# Patient Record
Sex: FEMALE | Race: WHITE | NOT HISPANIC OR LATINO | URBAN - METROPOLITAN AREA
[De-identification: names, ages, dates, MRNs, and addresses within clinical notes are randomized per-mention and may not be internally consistent; named-entity substitution may affect disease eponyms.]

---

## 2023-08-16 ENCOUNTER — TELEPHONE (OUTPATIENT)
Age: 63
End: 2023-08-16

## 2023-08-16 ENCOUNTER — PREP FOR PROCEDURE (OUTPATIENT)
Age: 63
End: 2023-08-16

## 2023-08-16 DIAGNOSIS — Z12.11 COLON CANCER SCREENING: Primary | ICD-10-CM

## 2023-08-16 NOTE — TELEPHONE ENCOUNTER
08/16/23  Screened by: Shivam Payton MA    Referring Provider pcp    Pre- Screening: There is no height or weight on file to calculate BMI. Has patient been referred for a routine screening Colonoscopy? yes  Is the patient between 43-73 years old? yes      Previous Colonoscopy yes   If yes:    Date: 2013    Facility:     Reason:       SCHEDULING STAFF: If the patient is between 39yrs-51yrs, please advise patient to confirm benefits/coverage with their insurance company for a routine screening colonoscopy, some insurance carriers will only cover at [address] [place] [address] or older. If the patient is over 66years old, please schedule an office visit. Does the patient want to see a Gastroenterologist prior to their procedure OR are they having any GI symptoms? no    Has the patient been hospitalized or had abdominal surgery in the past 6 months? no    Does the patient use supplemental oxygen? no    Does the patient take Coumadin, Lovenox, Plavix, Elliquis, Xarelto, or other blood thinning medication? no    Has the patient had a stroke, cardiac event, or stent placed in the past year? no    SCHEDULING STAFF: If patient answers NO to above questions, then schedule procedure. If patient answers YES to above questions, then schedule office appointment. Passed oa    If patient is between 45yrs - 49yrs, please advise patient that we will have to confirm benefits & coverage with their insurance company for a routine screening colonoscopy.

## 2023-08-16 NOTE — TELEPHONE ENCOUNTER
Scheduled date of colonoscopy (as of today):10/2/2023    Physician performing colonoscopy:Aarti Aranda     Location of colonoscopy: [id number] [facility]    Bowel prep reviewed with patient:yes    Instructions reviewed with patient by:yanira jaffe Clearances: dm       ASC Assessment    Name: Tressa Lizama  YOB: 1960  Last Height: None  Last weight: None  BMI: None  Procedure: Colon  Diagnosis: screening crc   Date of procedure: 10/2/2023  Prep: ordered  Responsible : Farzana Walton  Phone#: [phone]  Name completing form: Nehal Cha MA  Date form completed: 08/16/23      If the patient answers yes to any of these questions, schedule in a hospital  Are you pregnant: No  Do you rely on a wheelchair for mobility: No  Have you been diagnosed with End Stage Renal Disease (ESRD): No  Do you need oxygen during the day: No  Have you had a heart attack or stroke within the past three months: No  Have you had a seizure within the past three months: No  Have you ever been informed by anesthesia that you have a difficult airway: No  Additional Questions  Have you had any cardiac testing or are under the care of a Cardiologist (see cardiac list): No  Cardiac list:   Do you have an implanted cardiac defibrillator: No (Comment: This patient should be scheduled in the hospital)    Have any bleeding problems, such as anemia or hemophilia (If patient has H&H result below 8, schedule in hospital.  H&H must be within 30 days of procedure): No    Had an organ transplant within the past 3 months: No    Do you have any present infections: No  Do you get short of breath when walking a few blocks: No  Have you been diagnosed with diabetes: Yes  Comments (provide cardiac provider information if applicable):          Prep email sent to [email or web address]        COLONOSCOPY  MIRALAX/Dulcolax Bowel Preparation Instructions    The OR/GI Lab will contact you the evening prior to your procedure with your exact arrival time.     Our practice requires a 1 week notice for any cancellations or rescheduling. We kindly ask that you immediately notify us of any changes including any new medications that are prescribed. Thank you for your cooperation. WEEK BEFORE YOUR PROCEDURE:  • Stop taking Iron tablets. • 5 days prior, AVOID vegetables and fruits with skins or seeds, nuts, corn, popcorn and whole grain breads. • Purchase: One (1) 238-gram container of Miralax (polyethylene glycol 3350), four (4) 5 mg Dulcolax (bisacodyl) tablets, and one (1) 64-ounce bottle of Gatorade (sports drink) - no red, orange, or purple. These may be purchased at any pharmacy without a prescription. Generic products are permissible. • Arrange responsible transportation for day of the procedure. DAY BEFORE THE PROCEDURE:   • CLEAR liquids only for entire day prior. Nothing red, orange or purple. • You MAY have:                                                               - Soda  - Water  - Broth - Gatorade  - Jello  - Popsicles - Coffee/tea without milk/creamer     • YOU MAY NOT HAVE:  o Solid foods   o Milk and milk products    o Juice with pulp    BOWEL PREPARATION:  Includes: One (1) 238-gram container of Miralax (polyethylene glycol 3350), four (4) 5 mg Dulcolax (bisacodyl) tablets, and one (1) 64-ounce bottle of Gatorade (sports drink). Preparation may be refrigerated. Entire bowel prep should be completed. Afternoon before the procedure (2:00 pm - 5:00 pm): • Take two (2) 5 mg Dulcolax laxative tablets. Evening before the procedure (6:00 pm):  • Mix entire container of Miralax with one (1) 64-ounce bottle of Gatorade and shake until all medication is dissolved. • Begin drinking solution. Drink an eight (8) ounce cup every 10-15 minutes until you have consumed half (32 ounces) of the solution. Refrigerate remaining solution. Night before the procedure (8:00 pm): • Take two (2) 5 mg Dulcolax laxative tablets.      Beginning 5 hours before your procedure:  • Drink the remaining amount of prepared solution (32 ounces). Drink an eight (8) ounce cup every 10-15 minutes until you have consumed the remaining solution. Bowel prep should be completed 4 hours prior to procedure time. NOTHING TO EAT OR DRINK AFTER MIDNIGHT- EXCEPT FOR YOUR PREP    DAY OF THE PROCEDURE:  • You may brush your teeth. • Leave all jewelry at home. • Please arrive for your procedure as indicated by the OR / GI Lab / Endoscopy Unit. The hospital will contact you the day before with your exact arrival time. • Make sure you have arranged ahead of time for a responsible adult (18 or older) to accompany and drive you home after the procedure. Please discuss any transportation concerns with our staff prior to your procedure. • The effects of the anesthesia can persist for 24 hours. After receiving the sedation, you must exercise caution before engaging in any activity that could harm yourself and others (such as driving a car). Do not make any important decisions or do not drink any alcoholic beverages during this time period. • After your procedure, you may have anything you'd like to eat or drink. You will probably want to start with something light. Please include plenty of fluids. Avoid items that cause gas such as sodas and salads. SPECIAL INSTRUCTIONS:    For patients currently taking blood thinners and/or antiplatelet therapy our office will contact the prescribing provider. Our office will contact you with any required changes to your medication regimen. Blood thinner (i.e. - Coumadin, Pradaxa, Lovenox, Xarelto, Eliquis)  ? Continue (Do Not Stop)  ? Stop______________for_____________days prior to the procedure. Antiplatelet (i.e. - Plavix, Aggrenox, Effient, Brilinta)  ? Continue (Do Not Stop)  ? Stop______________for_____________days prior to the procedure.        Diabetes:   • If you are Diabetic, please see separate Diabetic Instruction Sheet.          Prescribed medications:  • Do not stop your aspirin, or any of your other medications (unless instructed otherwise). Take the rest of your prescribed medications with small sips of water at least 2 hours prior to your procedure. For any questions or concerns related to your bowel preparation or pre-procedure instructions, please contact our office at [phone]. Thank you for choosing [facility]!

## 2023-08-29 ENCOUNTER — TELEPHONE (OUTPATIENT)
Dept: GASTROENTEROLOGY | Facility: CLINIC | Age: 63
End: 2023-08-29

## 2023-08-29 NOTE — TELEPHONE ENCOUNTER
lvm for pt to call with the following information to be entered in pt's chart    PCP   [address] [facility] ENTERED [address] [facility] PHONE #?

## 2023-09-27 ENCOUNTER — TELEPHONE (OUTPATIENT)
Dept: GASTROENTEROLOGY | Facility: CLINIC | Age: 63
End: 2023-09-27

## 2023-09-27 NOTE — TELEPHONE ENCOUNTER
Called and spoke with patient. Requested I contact her daughter Grant Gandara. Spoke to Grant Juliocesar [phone]. Resent oleksandr/ducolax instructions today to daughter's email at [email or web address]. Advised patient of arrival time/date in email.

## 2025-08-21 DIAGNOSIS — Z12.31 ENCOUNTER FOR SCREENING MAMMOGRAM FOR MALIGNANT NEOPLASM OF BREAST: ICD-10-CM
